# Patient Record
Sex: FEMALE | Race: WHITE
[De-identification: names, ages, dates, MRNs, and addresses within clinical notes are randomized per-mention and may not be internally consistent; named-entity substitution may affect disease eponyms.]

---

## 2018-03-01 ENCOUNTER — HOSPITAL ENCOUNTER (OUTPATIENT)
Dept: HOSPITAL 84 - D.RT | Age: 73
Discharge: HOME | End: 2018-03-01
Attending: INTERNAL MEDICINE
Payer: MEDICARE

## 2018-03-01 VITALS — BODY MASS INDEX: 26.2 KG/M2

## 2018-03-01 DIAGNOSIS — R06.00: ICD-10-CM

## 2018-03-01 DIAGNOSIS — R60.0: Primary | ICD-10-CM

## 2020-06-08 ENCOUNTER — HOSPITAL ENCOUNTER (OUTPATIENT)
Dept: HOSPITAL 84 - D.OPS | Age: 75
Discharge: HOME | End: 2020-06-08
Attending: INTERNAL MEDICINE
Payer: MEDICARE

## 2020-06-08 VITALS — BODY MASS INDEX: 26.55 KG/M2 | WEIGHT: 149.82 LBS | HEIGHT: 63 IN | BODY MASS INDEX: 26.55 KG/M2

## 2020-06-08 VITALS — SYSTOLIC BLOOD PRESSURE: 118 MMHG | DIASTOLIC BLOOD PRESSURE: 92 MMHG

## 2020-06-08 DIAGNOSIS — K66.0: ICD-10-CM

## 2020-06-08 DIAGNOSIS — K63.5: ICD-10-CM

## 2020-06-08 DIAGNOSIS — C83.03: ICD-10-CM

## 2020-06-08 DIAGNOSIS — Z12.11: Primary | ICD-10-CM

## 2020-06-08 LAB
BASOPHILS NFR BLD AUTO: 0.5 % (ref 0–2)
EOSINOPHIL NFR BLD: 4.6 % (ref 0–7)
ERYTHROCYTE [DISTWIDTH] IN BLOOD BY AUTOMATED COUNT: 12.7 % (ref 11.5–14.5)
HCT VFR BLD CALC: 44.2 % (ref 36–48)
HGB BLD-MCNC: 14.8 G/DL (ref 12–16)
IMM GRANULOCYTES NFR BLD: 0.3 % (ref 0–5)
LYMPHOCYTES NFR BLD AUTO: 21.6 % (ref 15–50)
MCH RBC QN AUTO: 32.9 PG (ref 26–34)
MCHC RBC AUTO-ENTMCNC: 33.5 G/DL (ref 31–37)
MCV RBC: 98.2 FL (ref 80–100)
MONOCYTES NFR BLD: 12.6 % (ref 2–11)
NEUTROPHILS NFR BLD AUTO: 60.4 % (ref 40–80)
PLATELET # BLD: 252 10X3/UL (ref 130–400)
PMV BLD AUTO: 8.2 FL (ref 7.4–10.4)
RBC # BLD AUTO: 4.5 10X6/UL (ref 4–5.4)
WBC # BLD AUTO: 6.3 10X3/UL (ref 4.8–10.8)

## 2020-06-09 NOTE — OP
PATIENT NAME:  KANU GRIMALDO                        MEDICAL RECORD: [id number]
:[birth year]                                             LOCATION:D.OPS          
                                                         ADMISSION DATE:        
SURGEON:  PRABHJOT ARREAGA DO             
 
 
DATE OF OPERATION:  2020
 
PROCEDURE:  Colonoscopy with biopsy, polypectomy.
 
INDICATIONS FOR PROCEDURE:  Screening for colorectal cancer, small cell B-cell
lymphoma with intra-abdominal lymphadenopathy, status post right hemicolectomy.
 
SCOPE:  Olympus video pediatric colonoscope.
 
MEDICATIONS:  Propofol 500 mg IV per anesthesia.
 
WITHDRAWAL TIME:  19 minutes.
 
ESTIMATED BLOOD LOSS:  Minimal.
 
COMPLICATIONS:  None.
 
FINDINGS:  Informed consent was given.  The patient was made comfortable with
the above medication.  After reaching an adequate level of sedation by slow IV
push, the patient was placed on her left side.  A digital rectal examination was
performed and it was normal.  The endoscope was then advanced under direct
visualization through the rectum to the anastomosis from the previous right
hemicolectomy.  The endoscope briefly intubated the terminal ileum.  The
endoscope was then withdrawn slowly while the mucosa was carefully examined. 
Prep quality was good.  There was one site in the cecum/anastomotic area that
appeared slightly polypoid.  Two cold forceps biopsies were performed for
polypectomy.  This tissue will be submitted to histopathology for evaluation. 
There were no other polyps visualized on today's examination.  There was mild
diverticulosis involving the sigmoid colon.  There were significant adhesions
likely related to the patient's history of radiation to her abdomen. 
Retroflexion was performed in the rectum with visualization of grade I internal
hemorrhoids without bleeding.  The endoscope was then withdrawn from the
patient.  The patient tolerated the procedure well and there were no
complications.
 
IMPRESSION:
1.  Single area of possible polypoid tissue located near the anastomosis, which
was removed using cold forceps in 2 pieces.
2.  Evidence of prior surgery in the form of a right hemicolectomy.
3.  Mild diverticulosis of the sigmoid colon.
4.  Grade I internal hemorrhoids without bleeding.
 
PLAN AND RECOMMENDATIONS:
1.  Discharge home when recovery parameters are met.
2.  Follow up biopsy specimen results.
3.  High fiber diet.
4.  Continue current medications.
5.  Recall colonoscopy will be dependent on results of the polyp biopsy on
today's examination.  The patient has informed me that she does not plan on
doing any further colonoscopies and with her history of severe adhesions and
scar tissue, I do not completely disagree with this as the risk associated with
the procedure is elevated compared to the benefits in a person who does not have
 
 
 
OPERATIVE REPORT                               [id number]    KANU GRIMALDO      
 
 
a strong history of polyps.
6.  We will follow up after biopsy results return.
 
TRANSINT:[id number] Voice Confirmation ID: [id number] DOCUMENT ID: [id number]
                                           
                                           PRABHJOT ARREAGA DO             
 
 
 
Electronically Signed by PRABHJOT BORREGO on 20 at 0712
 
 
 
 
 
 
 
 
 
 
 
 
 
 
 
 
 
 
 
 
 
 
 
 
 
 
 
 
 
 
 
 
 
 
 
 
 
CC:                                                             [id number]
DICTATION DATE: 20     :     20 1828      [facility] 
                                                                      20
[facility]                                          
[address] [place] [address]

## 2020-06-26 ENCOUNTER — HOSPITAL ENCOUNTER (EMERGENCY)
Dept: HOSPITAL 84 - D.ER | Age: 75
Discharge: HOME | End: 2020-06-26
Payer: MEDICARE

## 2020-06-26 VITALS — DIASTOLIC BLOOD PRESSURE: 73 MMHG | SYSTOLIC BLOOD PRESSURE: 142 MMHG

## 2020-06-26 VITALS — BODY MASS INDEX: 26.46 KG/M2 | HEIGHT: 63 IN | WEIGHT: 149.31 LBS

## 2020-06-26 DIAGNOSIS — I49.9: ICD-10-CM

## 2020-06-26 DIAGNOSIS — R42: Primary | ICD-10-CM

## 2020-06-26 DIAGNOSIS — I50.9: ICD-10-CM

## 2020-06-26 LAB
ALBUMIN SERPL-MCNC: 3.9 G/DL (ref 3.4–5)
ALP SERPL-CCNC: 95 U/L (ref 30–120)
ALT SERPL-CCNC: 39 U/L (ref 10–68)
ANION GAP SERPL CALC-SCNC: 13.4 MMOL/L (ref 8–16)
APTT BLD: 26.3 SECONDS (ref 22.8–39.4)
BILIRUB SERPL-MCNC: 0.45 MG/DL (ref 0.2–1.3)
BUN SERPL-MCNC: 24 MG/DL (ref 7–18)
CALCIUM SERPL-MCNC: 9.4 MG/DL (ref 8.5–10.1)
CHLORIDE SERPL-SCNC: 101 MMOL/L (ref 98–107)
CK MB SERPL-MCNC: 2.9 U/L (ref 0–3.6)
CK SERPL-CCNC: 221 UL (ref 21–215)
CO2 SERPL-SCNC: 26.4 MMOL/L (ref 21–32)
CREAT SERPL-MCNC: 1.1 MG/DL (ref 0.6–1.3)
ERYTHROCYTE [DISTWIDTH] IN BLOOD BY AUTOMATED COUNT: 12.4 % (ref 11.5–14.5)
GLOBULIN SER-MCNC: 2.7 G/L
GLUCOSE SERPL-MCNC: 135 MG/DL (ref 74–106)
HCT VFR BLD CALC: 42.5 % (ref 36–48)
HGB BLD-MCNC: 14.1 G/DL (ref 12–16)
INR PPP: 0.86 (ref 0.85–1.17)
LYMPHOCYTES NFR BLD AUTO: 18.6 % (ref 15–50)
MAGNESIUM SERPL-MCNC: 2.1 MG/DL (ref 1.8–2.4)
MCH RBC QN AUTO: 32 PG (ref 26–34)
MCHC RBC AUTO-ENTMCNC: 33.2 G/DL (ref 31–37)
MCV RBC: 96.6 FL (ref 80–100)
NEUTROPHILS NFR BLD AUTO: 74.5 % (ref 40–80)
OSMOLALITY SERPL CALC.SUM OF ELEC: 279 MOSM/KG (ref 275–300)
PLATELET # BLD: 277 10X3/UL (ref 130–400)
PMV BLD AUTO: 7.7 FL (ref 7.4–10.4)
POTASSIUM SERPL-SCNC: 3.8 MMOL/L (ref 3.5–5.1)
PROT SERPL-MCNC: 6.6 G/DL (ref 6.4–8.2)
PROTHROMBIN TIME: 11.7 SECONDS (ref 11.6–15)
RBC # BLD AUTO: 4.4 10X6/UL (ref 4–5.4)
SODIUM SERPL-SCNC: 137 MMOL/L (ref 136–145)
TROPONIN I SERPL-MCNC: < 0.017 NG/ML (ref 0–0.06)
WBC # BLD AUTO: 5.5 10X3/UL (ref 4.8–10.8)